# Patient Record
Sex: MALE | Race: WHITE | NOT HISPANIC OR LATINO
[De-identification: names, ages, dates, MRNs, and addresses within clinical notes are randomized per-mention and may not be internally consistent; named-entity substitution may affect disease eponyms.]

---

## 2017-01-05 ENCOUNTER — RX ONLY (OUTPATIENT)
Age: 22
Setting detail: RX ONLY
End: 2017-01-05

## 2017-02-03 ENCOUNTER — APPOINTMENT (RX ONLY)
Dept: URBAN - METROPOLITAN AREA CLINIC 76 | Facility: CLINIC | Age: 22
Setting detail: DERMATOLOGY
End: 2017-02-03

## 2017-02-03 DIAGNOSIS — Z79.899 OTHER LONG TERM (CURRENT) DRUG THERAPY: ICD-10-CM

## 2017-02-03 DIAGNOSIS — L70.0 ACNE VULGARIS: ICD-10-CM

## 2017-02-03 PROCEDURE — ? PRESCRIPTION

## 2017-02-03 PROCEDURE — 99214 OFFICE O/P EST MOD 30 MIN: CPT

## 2017-02-03 PROCEDURE — ? HIGH RISK MEDICATION MONITORING

## 2017-02-03 PROCEDURE — ? COUNSELING

## 2017-02-03 RX ORDER — ISOTRETINOIN 30 MG/1
CAPSULE, LIQUID FILLED ORAL
Qty: 60 | Refills: 0 | Status: ERX | COMMUNITY
Start: 2017-02-03

## 2017-02-03 RX ADMIN — ISOTRETINOIN: 30 CAPSULE, LIQUID FILLED ORAL at 19:39

## 2017-02-03 ASSESSMENT — LOCATION SIMPLE DESCRIPTION DERM
LOCATION SIMPLE: RIGHT ANTERIOR NECK
LOCATION SIMPLE: RIGHT UPPER BACK
LOCATION SIMPLE: LEFT CHEEK

## 2017-02-03 ASSESSMENT — LOCATION DETAILED DESCRIPTION DERM
LOCATION DETAILED: LEFT INFERIOR CENTRAL MALAR CHEEK
LOCATION DETAILED: RIGHT SUPERIOR MEDIAL UPPER BACK
LOCATION DETAILED: RIGHT INFERIOR ANTERIOR NECK

## 2017-02-03 ASSESSMENT — LOCATION ZONE DERM
LOCATION ZONE: FACE
LOCATION ZONE: NECK
LOCATION ZONE: TRUNK

## 2017-02-03 NOTE — HPI: MEDICATION (ACCUTANE)
How Severe Is It?: mild
Is This A New Presentation, Or A Follow-Up?: Follow Up Accutane
Display Cumulative Dose In The Note?: Yes
When Was Your Last Visit?: 01/04/2017
Patient Reported Weight In Kg (Required For Calculation): 79

## 2017-02-28 ENCOUNTER — APPOINTMENT (RX ONLY)
Dept: URBAN - METROPOLITAN AREA CLINIC 76 | Facility: CLINIC | Age: 22
Setting detail: DERMATOLOGY
End: 2017-02-28

## 2017-02-28 DIAGNOSIS — Z79.899 OTHER LONG TERM (CURRENT) DRUG THERAPY: ICD-10-CM

## 2017-02-28 PROCEDURE — ? ORDER TESTS

## 2017-03-03 ENCOUNTER — APPOINTMENT (RX ONLY)
Dept: URBAN - METROPOLITAN AREA CLINIC 76 | Facility: CLINIC | Age: 22
Setting detail: DERMATOLOGY
End: 2017-03-03

## 2017-03-03 ENCOUNTER — RX ONLY (OUTPATIENT)
Age: 22
Setting detail: RX ONLY
End: 2017-03-03

## 2017-03-03 DIAGNOSIS — E78.1 PURE HYPERGLYCERIDEMIA: ICD-10-CM

## 2017-03-03 DIAGNOSIS — Z79.899 OTHER LONG TERM (CURRENT) DRUG THERAPY: ICD-10-CM

## 2017-03-03 DIAGNOSIS — L91.0 HYPERTROPHIC SCAR: ICD-10-CM

## 2017-03-03 DIAGNOSIS — T300 ERYTHEMA [FIRST DEGREE], UNSPECIFIED SITE: ICD-10-CM

## 2017-03-03 DIAGNOSIS — L70.0 ACNE VULGARIS: ICD-10-CM

## 2017-03-03 PROBLEM — T23.101A BURN OF FIRST DEGREE OF RIGHT HAND, UNSPECIFIED SITE, INITIAL ENCOUNTER: Status: ACTIVE | Noted: 2017-03-03

## 2017-03-03 PROCEDURE — ? HIGH RISK MEDICATION MONITORING

## 2017-03-03 PROCEDURE — 99214 OFFICE O/P EST MOD 30 MIN: CPT

## 2017-03-03 PROCEDURE — ? ISOTRETINOIN MONITORING

## 2017-03-03 PROCEDURE — ? MEDICATION COUNSELING

## 2017-03-03 PROCEDURE — ? ORDER TESTS

## 2017-03-03 PROCEDURE — ? TREATMENT REGIMEN

## 2017-03-03 PROCEDURE — ? COUNSELING

## 2017-03-03 PROCEDURE — ? DIAGNOSIS COMMENT

## 2017-03-03 RX ORDER — ISOTRETINOIN 30 MG/1
CAPSULE, LIQUID FILLED ORAL
Qty: 60 | Refills: 0 | Status: ERX

## 2017-03-03 ASSESSMENT — LOCATION DETAILED DESCRIPTION DERM
LOCATION DETAILED: RIGHT RADIAL PALM
LOCATION DETAILED: RIGHT SUPERIOR MEDIAL UPPER BACK
LOCATION DETAILED: SUPERIOR THORACIC SPINE
LOCATION DETAILED: HAIR
LOCATION DETAILED: RIGHT INFERIOR MEDIAL FOREHEAD
LOCATION DETAILED: STERNUM

## 2017-03-03 ASSESSMENT — LOCATION SIMPLE DESCRIPTION DERM
LOCATION SIMPLE: RIGHT UPPER BACK
LOCATION SIMPLE: UPPER BACK
LOCATION SIMPLE: HAIR
LOCATION SIMPLE: CHEST
LOCATION SIMPLE: RIGHT HAND
LOCATION SIMPLE: RIGHT FOREHEAD

## 2017-03-03 ASSESSMENT — LOCATION ZONE DERM
LOCATION ZONE: TRUNK
LOCATION ZONE: HAND
LOCATION ZONE: SCALP
LOCATION ZONE: FACE

## 2017-03-03 NOTE — HPI: MEDICATION (ACCUTANE)
How Severe Is It?: moderate
Is This A New Presentation, Or A Follow-Up?: Follow Up Accutane
Additional History: Patient's total cumulative isotretinoin dose is 3000mg. HIs goal dose based on weight of 79kg is 9480 - 11,850mg.

## 2017-03-03 NOTE — PROCEDURE: ISOTRETINOIN MONITORING
Kilograms Preamble Statement (Weight Entered In Details Tab): Reported Weight in pounds:
Patient Weight (Optional But Required For Cumulative Dose-Numbers And Decimals Only): 79
Display Individual Monthly Dosage In The Note (If Yes Will Display All Dosages Which Are Not N/A): no
Detail Level: Zone
Months Of Therapy Completed: 2
Weight Units: kilograms

## 2017-03-03 NOTE — PROCEDURE: TREATMENT REGIMEN
Samples Given: Cerave and Cetaphil samples given
Detail Level: Zone
Discontinue Regimen: Discontinue all other acne medications while on Isotretinoin including over the counters and prescriptions.

## 2017-03-31 ENCOUNTER — APPOINTMENT (RX ONLY)
Dept: URBAN - METROPOLITAN AREA CLINIC 76 | Facility: CLINIC | Age: 22
Setting detail: DERMATOLOGY
End: 2017-03-31

## 2017-03-31 DIAGNOSIS — E78.1 PURE HYPERGLYCERIDEMIA: ICD-10-CM

## 2017-03-31 DIAGNOSIS — L259 CONTACT DERMATITIS AND OTHER ECZEMA, UNSPECIFIED CAUSE: ICD-10-CM

## 2017-03-31 DIAGNOSIS — Z79.899 OTHER LONG TERM (CURRENT) DRUG THERAPY: ICD-10-CM

## 2017-03-31 DIAGNOSIS — L70.0 ACNE VULGARIS: ICD-10-CM

## 2017-03-31 PROBLEM — L30.8 OTHER SPECIFIED DERMATITIS: Status: ACTIVE | Noted: 2017-03-31

## 2017-03-31 PROCEDURE — ? MEDICATION COUNSELING

## 2017-03-31 PROCEDURE — ? PRESCRIPTION

## 2017-03-31 PROCEDURE — ? ISOTRETINOIN MONITORING

## 2017-03-31 PROCEDURE — 99214 OFFICE O/P EST MOD 30 MIN: CPT

## 2017-03-31 PROCEDURE — ? ORDER TESTS

## 2017-03-31 PROCEDURE — ? TREATMENT REGIMEN

## 2017-03-31 PROCEDURE — ? COUNSELING

## 2017-03-31 RX ORDER — TRIAMCINOLONE ACETONIDE 0.1 %
OINTMENT (GRAM) TOPICAL
Qty: 1 | Refills: 1 | Status: ERX | COMMUNITY
Start: 2017-03-31

## 2017-03-31 RX ORDER — ISOTRETINOIN 30 MG/1
CAPSULE, LIQUID FILLED ORAL
Qty: 60 | Refills: 0 | Status: ERX | COMMUNITY
Start: 2017-03-31

## 2017-03-31 RX ADMIN — Medication: at 19:00

## 2017-03-31 RX ADMIN — ISOTRETINOIN: 30 CAPSULE, LIQUID FILLED ORAL at 19:02

## 2017-03-31 ASSESSMENT — SEVERITY ASSESSMENT: SEVERITY: MODERATE

## 2017-03-31 ASSESSMENT — BSA RASH: BSA RASH: 10

## 2017-03-31 ASSESSMENT — SEVERITY ASSESSMENT OVERALL AMONG ALL PATIENTS
IN YOUR EXPERIENCE, AMONG ALL PATIENTS YOU HAVE SEEN WITH THIS CONDITION, HOW SEVERE IS THIS PATIENT'S CONDITION?: INFLAMMATORY LESIONS MORE APPARENT; MANY COMEDONES AND PAPULES/PUSTULES, +/- FEW NODULOCYSTIC LESIONS

## 2017-03-31 ASSESSMENT — PAIN INTENSITY VAS: HOW INTENSE IS YOUR PAIN 0 BEING NO PAIN, 10 BEING THE MOST SEVERE PAIN POSSIBLE?: NO PAIN

## 2017-03-31 NOTE — PROCEDURE: TREATMENT REGIMEN
Plan: Patient to continue isotretinoin at the following dose: 30mg BID (no increase in dose planned given onset of eczema and xerosis). Total cumulative isotretinoin dose: 4800 (weight: 79kg).
Otc Regimen: Patient to continue dry skin care regimen including frequent application of aquaphor/vaseline to lips, non-comedogenic moisturizer and gentle face wash
Detail Level: Simple

## 2017-03-31 NOTE — PROCEDURE: ISOTRETINOIN MONITORING
Display Individual Monthly Dosage In The Note (If Yes Will Display All Dosages Which Are Not N/A): no
Months Of Therapy Completed: 1
Kilograms Preamble Statement (Weight Entered In Details Tab): Reported Weight in pounds:
Weight Units: pounds
Detail Level: Zone

## 2017-05-17 ENCOUNTER — APPOINTMENT (RX ONLY)
Dept: URBAN - METROPOLITAN AREA CLINIC 76 | Facility: CLINIC | Age: 22
Setting detail: DERMATOLOGY
End: 2017-05-17

## 2017-05-17 DIAGNOSIS — L70.0 ACNE VULGARIS: ICD-10-CM

## 2017-05-17 DIAGNOSIS — E78.1 PURE HYPERGLYCERIDEMIA: ICD-10-CM

## 2017-05-17 DIAGNOSIS — Z79.899 OTHER LONG TERM (CURRENT) DRUG THERAPY: ICD-10-CM

## 2017-05-17 PROCEDURE — ? MEDICATION COUNSELING

## 2017-05-17 PROCEDURE — 99213 OFFICE O/P EST LOW 20 MIN: CPT

## 2017-05-17 PROCEDURE — ? TREATMENT REGIMEN

## 2017-05-17 PROCEDURE — ? ORDER TESTS

## 2017-05-17 PROCEDURE — ? COUNSELING

## 2017-05-17 PROCEDURE — ? ISOTRETINOIN MONITORING

## 2017-05-17 PROCEDURE — ? PRESCRIPTION

## 2017-05-17 RX ORDER — ISOTRETINOIN 30 MG/1
CAPSULE, LIQUID FILLED ORAL
Qty: 60 | Refills: 0 | Status: ERX

## 2017-05-17 ASSESSMENT — LOCATION ZONE DERM
LOCATION ZONE: FACE
LOCATION ZONE: TRUNK

## 2017-05-17 ASSESSMENT — LOCATION DETAILED DESCRIPTION DERM
LOCATION DETAILED: SUPERIOR THORACIC SPINE
LOCATION DETAILED: RIGHT MEDIAL SUPERIOR CHEST
LOCATION DETAILED: LEFT INFERIOR CENTRAL MALAR CHEEK

## 2017-05-17 ASSESSMENT — LOCATION SIMPLE DESCRIPTION DERM
LOCATION SIMPLE: UPPER BACK
LOCATION SIMPLE: LEFT CHEEK
LOCATION SIMPLE: CHEST

## 2017-05-17 ASSESSMENT — SEVERITY ASSESSMENT OVERALL AMONG ALL PATIENTS
IN YOUR EXPERIENCE, AMONG ALL PATIENTS YOU HAVE SEEN WITH THIS CONDITION, HOW SEVERE IS THIS PATIENT'S CONDITION?: FEW INFLAMMATORY LESIONS, SOME NONINFLAMMATORY

## 2017-05-17 NOTE — PROCEDURE: TREATMENT REGIMEN
Otc Regimen: Patient to continue dry skin care regimen including frequent application of aquaphor/vaseline to lips, non-comedogenic moisturizer and gentle face wash
Detail Level: Simple
Plan: Patient to continue isotretinoin at the following dose: 30mg BID (no increase in dose planned given h/o eczema and xerosis). Total cumulative isotretinoin dose: 6600mg (weight: 79kg).

## 2017-05-17 NOTE — HPI: MEDICATION (ACCUTANE)
How Severe Is It?: moderate
Is This A New Presentation, Or A Follow-Up?: Follow Up Accutane
Patient Reported Weight In Kg (Required For Calculation): 79.0

## 2017-06-20 ENCOUNTER — RX ONLY (OUTPATIENT)
Age: 22
Setting detail: RX ONLY
End: 2017-06-20

## 2017-06-20 ENCOUNTER — APPOINTMENT (RX ONLY)
Dept: URBAN - METROPOLITAN AREA CLINIC 76 | Facility: CLINIC | Age: 22
Setting detail: DERMATOLOGY
End: 2017-06-20

## 2017-06-20 DIAGNOSIS — L70.0 ACNE VULGARIS: ICD-10-CM

## 2017-06-20 DIAGNOSIS — Z79.899 OTHER LONG TERM (CURRENT) DRUG THERAPY: ICD-10-CM

## 2017-06-20 PROCEDURE — ? MEDICATION COUNSELING

## 2017-06-20 PROCEDURE — 99213 OFFICE O/P EST LOW 20 MIN: CPT

## 2017-06-20 PROCEDURE — ? ORDER TESTS

## 2017-06-20 PROCEDURE — ? TREATMENT REGIMEN

## 2017-06-20 PROCEDURE — ? ISOTRETINOIN MONITORING

## 2017-06-20 PROCEDURE — ? PRESCRIPTION

## 2017-06-20 RX ORDER — ISOTRETINOIN 30 MG/1
CAPSULE ORAL
Qty: 60 | Refills: 0 | Status: ERX | COMMUNITY
Start: 2017-06-20

## 2017-06-20 RX ORDER — ISOTRETINOIN 30 MG/1
CAPSULE, LIQUID FILLED ORAL
Qty: 30 | Refills: 0 | Status: ERX

## 2017-06-20 RX ADMIN — ISOTRETINOIN: 30 CAPSULE ORAL at 16:39

## 2017-06-20 ASSESSMENT — SEVERITY ASSESSMENT OVERALL AMONG ALL PATIENTS
IN YOUR EXPERIENCE, AMONG ALL PATIENTS YOU HAVE SEEN WITH THIS CONDITION, HOW SEVERE IS THIS PATIENT'S CONDITION?: ALMOST CLEAR

## 2017-06-20 NOTE — PROCEDURE: ISOTRETINOIN MONITORING
Months Of Therapy Completed: 5
Weight Units: pounds
Display Individual Monthly Dosage In The Note (If Yes Will Display All Dosages Which Are Not N/A): no
Detail Level: Simple
Pounds Preamble Statement (Weight Entered In Details Tab): Reported Weight in pounds:

## 2017-06-20 NOTE — PROCEDURE: TREATMENT REGIMEN
Otc Regimen: Patient to continue dry skin care regimen including frequent application of aquaphor/vaseline to lips, non-comedogenic moisturizer and gentle face wash
Plan: Patient to continue isotretinoin at the following dose: 30mg BID (this will be the last month of treatment). Total cumulative isotretinoin dose: 8400mg.
Detail Level: Simple

## 2017-08-08 ENCOUNTER — APPOINTMENT (RX ONLY)
Dept: URBAN - METROPOLITAN AREA CLINIC 76 | Facility: CLINIC | Age: 22
Setting detail: DERMATOLOGY
End: 2017-08-08

## 2017-08-08 DIAGNOSIS — Z79.899 OTHER LONG TERM (CURRENT) DRUG THERAPY: ICD-10-CM

## 2017-08-08 DIAGNOSIS — L91.0 HYPERTROPHIC SCAR: ICD-10-CM

## 2017-08-08 DIAGNOSIS — L70.0 ACNE VULGARIS: ICD-10-CM

## 2017-08-08 PROBLEM — L708 OTHER ACNE: Status: ACTIVE | Noted: 2017-08-08

## 2017-08-08 PROBLEM — L700 OTHER ACNE: Status: ACTIVE | Noted: 2017-08-08

## 2017-08-08 PROBLEM — L701 OTHER ACNE: Status: ACTIVE | Noted: 2017-08-08

## 2017-08-08 PROCEDURE — ? TREATMENT REGIMEN

## 2017-08-08 PROCEDURE — 99213 OFFICE O/P EST LOW 20 MIN: CPT

## 2017-08-08 PROCEDURE — ? IN-HOUSE DISPENSING PHARMACY

## 2017-08-08 PROCEDURE — ? MEDICATION COUNSELING

## 2017-08-08 PROCEDURE — ? ISOTRETINOIN MONITORING

## 2017-08-08 PROCEDURE — ? COUNSELING

## 2017-08-08 ASSESSMENT — SEVERITY ASSESSMENT OVERALL AMONG ALL PATIENTS
IN YOUR EXPERIENCE, AMONG ALL PATIENTS YOU HAVE SEEN WITH THIS CONDITION, HOW SEVERE IS THIS PATIENT'S CONDITION?: NORMAL

## 2017-08-08 ASSESSMENT — LOCATION ZONE DERM: LOCATION ZONE: TRUNK

## 2017-08-08 ASSESSMENT — LOCATION SIMPLE DESCRIPTION DERM: LOCATION SIMPLE: UPPER BACK

## 2017-08-08 ASSESSMENT — LOCATION DETAILED DESCRIPTION DERM: LOCATION DETAILED: SUPERIOR THORACIC SPINE

## 2017-08-08 NOTE — PROCEDURE: TREATMENT REGIMEN
Plan: Patient to discontinue isotretinoin as he has reached goal dose. Total cumulative isotretinoin dose: 10,200mg. He will start topical maintenance regimen with topical tretinoin/clindamycin once daily.
Otc Regimen: Patient to continue dry skin care regimen including frequent application of aquaphor/vaseline to lips, non-comedogenic moisturizer and gentle face wash
Detail Level: Simple
Plan: Patient will consider future intralesional steroid injections of keloids (he declines at this time).

## 2017-08-08 NOTE — PROCEDURE: ISOTRETINOIN MONITORING
Weight Units: pounds
Male Completion Statement: After discussing his treatment course we decided to discontinue isotretinoin therapy at this time. He shouldn't donate blood for one month after the last dose. He should call with any new symptoms of depression.
Kilograms Preamble Statement (Weight Entered In Details Tab): Reported Weight in pounds:
Months Of Therapy Completed: 6
Kilograms Preamble Statement (Weight Entered In Details Tab): Reported Weight in kilograms:
Detail Level: Zone
Display Individual Monthly Dosage In The Note (If Yes Will Display All Dosages Which Are Not N/A): no
Completed Therapy?: Yes- Male
Female Completion Statement: After discussing her treatment course we decided to discontinue isotretinoin therapy at this time. I explained that she would need to continue her birth control methods for at least one month after the last dosage. She should also get a pregnancy test one month after the last dose. She shouldn't donate blood for one month after the last dose. She should call with any new symptoms of depression.

## 2017-08-08 NOTE — HPI: MEDICATION (ACCUTANE)
How Severe Is It?: mild
Is This A New Presentation, Or A Follow-Up?: Follow Up Accutane
When Was Your Last Visit?: 6/20/17

## 2017-08-08 NOTE — PROCEDURE: IN-HOUSE DISPENSING PHARMACY
Product 72 Refills: 0
Product 31 Unit Type: mg
Product 11 Price/Unit (In Dollars): 40.00
Detail Level: Zone
Product 5 Refills: 11
Product 5 Amount/Unit (Numbers Only): 30
Product 51 Amount/Unit (Numbers Only): 30
Name Of Product 24: Triamcin 1% Ointment - [id number]
Product 3 Application Directions: Apply to acne prone area after moisturizer one time a day.
Product 34 Application Directions: Apply to affected area two times a day.
Product 2 Application Directions: Apply to affected area in the evening after moisturizer.  Avoid eyelids.
Product 41 Application Directions: Apply to hyperpigmented areas in the evening after moisturizer.
Name Of Product 21: Imiqui 5% / Levo 1% Gel - [id number]
Product 31 Application Directions: Apply to affected area before moisturizer one time a day.
Name Of Product 8: Taza 0.1% Cream - [id number]
Product 13 Amount/Unit (Numbers Only): 60
Name Of Product 14: Dermatitits Topical Foam - [id number]
Product 5 Application Directions: Apply to affected area in the evening after moisturizer.  Avoid eyelids.
Name Of Product 51: Anti- Fungal Nail Solution - 659024
Product 13 Unit Type: ml
Product 6 Application Directions: Apply to affected area before moisturizer.  Avoid eyelids.
Product 21 Price/Unit (In Dollars): 50.00
Name Of Product 6: Adap Combo Cream - [id number]
Name Of Product 5: Clind / Tret Combo Cream - 199540
Render Refills If Set To 0: Yes
Name Of Product 4: Acne Gel w/ Dapsone - 003045
Product 1 Application Directions: Use as face or body wash daily.
Name Of Product 7: Sod Sulf 10% / Sulf 2% - 070545
Name Of Product 2: Tret 0.05% Cream - 611298
Name Of Product 34: Tacro 0.1% Ointment - 150642
Product 13 Refills: 2
Product 24 Application Directions: Apply to affected area one time a day.
Product 2 Refills: 11
Product 21 Application Directions: Apply to affected area in the evening or every other evening.
Name Of Product 3: Severo / Clind Combo - [id number]
Product 13 Price/Unit (In Dollars): 45.00
Name Of Product 1: Acne Body Wash - 388280
Name Of Product 41: Hydroquin 6% Combo Cream - 514289
Product 51 Application Directions: Apply to affected nails daily for 10 months.
Product 5 Units Dispensed: 1
Name Of Product 11: Clob 0.05% Solution - 079586
Name Of Product 13: Clob 0.05% ointment - 225319
Name Of Product 31: Ivermec 1% / Met 1% Gel - 366672
Name Of Product 42: Kojic Melasma Cream - 444788
Name Of Product 12: Iodoquin / Shay Combo - 991628